# Patient Record
Sex: FEMALE | Race: WHITE | Employment: FULL TIME | URBAN - METROPOLITAN AREA
[De-identification: names, ages, dates, MRNs, and addresses within clinical notes are randomized per-mention and may not be internally consistent; named-entity substitution may affect disease eponyms.]

---

## 2020-10-21 ENCOUNTER — TRANSCRIBE ORDER (OUTPATIENT)
Dept: SCHEDULING | Age: 35
End: 2020-10-21

## 2020-10-21 DIAGNOSIS — H47.11 PAPILLEDEMA ASSOCIATED WITH INCREASED INTRACRANIAL PRESSURE: Primary | ICD-10-CM

## 2020-10-27 ENCOUNTER — HOSPITAL ENCOUNTER (OUTPATIENT)
Dept: MRI IMAGING | Age: 35
Discharge: HOME | End: 2020-10-27
Payer: COMMERCIAL

## 2020-10-27 VITALS — WEIGHT: 180 LBS

## 2020-10-27 DIAGNOSIS — H47.11 PAPILLEDEMA ASSOCIATED WITH INCREASED INTRACRANIAL PRESSURE: ICD-10-CM

## 2020-10-27 PROCEDURE — 70553 MRI BRAIN STEM W/O & W/DYE: CPT

## 2020-10-27 PROCEDURE — A9575 INJ GADOTERATE MEGLUMI 0.1ML: HCPCS | Performed by: RADIOLOGY

## 2020-10-27 PROCEDURE — 74011250636 HC RX REV CODE- 250/636: Performed by: RADIOLOGY

## 2020-10-27 RX ORDER — GADOTERATE MEGLUMINE 376.9 MG/ML
16 INJECTION INTRAVENOUS ONCE
Status: DISCONTINUED | OUTPATIENT
Start: 2020-10-27 | End: 2020-10-27 | Stop reason: SDUPTHER

## 2020-10-27 RX ORDER — GADOTERATE MEGLUMINE 376.9 MG/ML
15 INJECTION INTRAVENOUS ONCE
Status: COMPLETED | OUTPATIENT
Start: 2020-10-27 | End: 2020-10-27

## 2020-10-27 RX ADMIN — GADOTERATE MEGLUMINE 15 ML: 376.9 INJECTION INTRAVENOUS at 18:15

## 2020-10-30 ENCOUNTER — TRANSCRIBE ORDER (OUTPATIENT)
Dept: SCHEDULING | Age: 35
End: 2020-10-30

## 2020-12-01 ENCOUNTER — OFFICE VISIT (OUTPATIENT)
Dept: PRIMARY CARE CLINIC | Age: 35
End: 2020-12-01
Payer: COMMERCIAL

## 2020-12-01 VITALS
TEMPERATURE: 98.6 F | HEART RATE: 112 BPM | WEIGHT: 178.8 LBS | DIASTOLIC BLOOD PRESSURE: 88 MMHG | SYSTOLIC BLOOD PRESSURE: 140 MMHG | RESPIRATION RATE: 14 BRPM | OXYGEN SATURATION: 97 %

## 2020-12-01 DIAGNOSIS — E55.9 VITAMIN D DEFICIENCY: ICD-10-CM

## 2020-12-01 DIAGNOSIS — F41.8 OTHER SPECIFIED ANXIETY DISORDERS: ICD-10-CM

## 2020-12-01 DIAGNOSIS — R00.0 TACHYCARDIA: ICD-10-CM

## 2020-12-01 DIAGNOSIS — L30.9 ECZEMA OF BOTH HANDS: ICD-10-CM

## 2020-12-01 DIAGNOSIS — R51.9 FREQUENT HEADACHES: ICD-10-CM

## 2020-12-01 DIAGNOSIS — G93.2 IDIOPATHIC INTRACRANIAL HYPERTENSION: Primary | ICD-10-CM

## 2020-12-01 PROCEDURE — 99204 OFFICE O/P NEW MOD 45 MIN: CPT | Performed by: INTERNAL MEDICINE

## 2020-12-01 RX ORDER — FLUOXETINE 10 MG/1
10 CAPSULE ORAL DAILY
Qty: 30 CAP | Refills: 0 | Status: SHIPPED | OUTPATIENT
Start: 2020-12-01 | End: 2020-12-31

## 2020-12-01 RX ORDER — DESONIDE 0.5 MG/G
OINTMENT TOPICAL 2 TIMES DAILY
Qty: 15 G | Refills: 0 | Status: SHIPPED | OUTPATIENT
Start: 2020-12-01 | End: 2021-03-22 | Stop reason: SDUPTHER

## 2020-12-01 RX ORDER — LEVONORGESTREL 52 MG/1
INTRAUTERINE DEVICE INTRAUTERINE
COMMUNITY

## 2020-12-01 RX ORDER — CHOLECALCIFEROL (VITAMIN D3) 50 MCG
CAPSULE ORAL
COMMUNITY

## 2020-12-01 RX ORDER — ACETAZOLAMIDE 250 MG/1
250 TABLET ORAL 2 TIMES DAILY
COMMUNITY
Start: 2020-11-04

## 2020-12-01 RX ORDER — CETIRIZINE HYDROCHLORIDE, PSEUDOEPHEDRINE HYDROCHLORIDE 5; 120 MG/1; MG/1
1 TABLET, FILM COATED, EXTENDED RELEASE ORAL 2 TIMES DAILY
COMMUNITY

## 2020-12-01 NOTE — PROGRESS NOTES
Chief Complaint   Patient presents with   [facility].     Neuro ophthalmologist.    3 most recent PHQ Screens 12/1/2020   Little interest or pleasure in doing things Several days   Feeling down, depressed, irritable, or hopeless Several days   Total Score PHQ 2 2     Abuse Screening Questionnaire 12/1/2020   Do you ever feel afraid of your partner? N   Are you in a relationship with someone who physically or mentally threatens you? N   Is it safe for you to go home? Y     Visit Vitals  BP (!) 133/94 (BP 1 Location: Right arm, BP Patient Position: Sitting)   Pulse (!) 112   Temp 98.6 °F (37 °C) (Oral)   Resp 14   Wt 178 lb 12.8 oz (81.1 kg)   SpO2 97%     1. Have you been to the ER, urgent care clinic since your last visit? Hospitalized since your last visit?no    2. Have you seen or consulted any other health care providers outside of the [address] [place] [address] since your last visit? Include any pap smears or colon screening. Neuro ophthalmologist.

## 2020-12-01 NOTE — PROGRESS NOTES
Written by Pedro Luis Vo, as dictated by Dr. Christian Hanks MD.    Erika Cardenas is a 37 y.o. female. HPI  Pt presents today to establish care. She has lived in [address] [place] [address] for 2 years and has not had a PCP in 10 years. She had previously been living in the [facility] where it was harder to get an appt with a doctor. However, about a month ago she had an eye exam done, and her optometrist found some optic nerve swelling. She was referred to see a neuro-ophthalmologist who diagnosed her with idiopathic intracranial hypertension and started her on Diamox. Once she had this diagnosis, she decided she should start seeing a PCP to make sure she does not have any other underlying conditions. Pt's BP is elevated today in office at 153/96, 140/88 on manual repeat in L arm while sitting down. Her heart rate is elevated today in office as well, and she notes that this typically happens when she is anxious. Since starting Diamox, she has been having more frequent headaches. She has an appt coming up with her neuro-ophthalmologist this week and will be able to have this evaluated. She has been dealing with anxiety for her entire life, and she was first diagnosed with it when she was 11years old. Her parents managed it when she was younger, and she learned coping strategies by working with a psychologist. She is not currently talking to a psychologist and has not taken a medication for anxiety in a long time, but she remembers that Prozac worked for her when she was a child. She is open to trying medications and starting something today. Her last Pap smear was in 08/2018. She got her flu vaccine in September, and she got her last Tdap vaccine in 2011. 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           Current Outpatient Medications on File Prior to Visit   Medication Sig Dispense Refill    acetaZOLAMIDE (DIAMOX) 250 mg tablet TAKE 2 TABLETS BY MOUTH TWICE A DAY      cetirizine-pseudoePHEDrine (ZyrTEC-D) 5-120 mg Tb12 Take 1 Tab by mouth two (2) times a day.  B.infantis-B.ani-B.long-B.bifi (Probiotic 4X) 10-15 mg TbEC Take  by mouth.  levonorgestreL (Mirena) 20 mcg/24 hours (6 yrs) 52 mg IUD Mirena 20 mcg/24 hours (6 yrs) 52 mg intrauterine device   Take by intrauterine route. No current facility-administered medications on file prior to visit.         No Known Allergies    Past Medical History:   Diagnosis Date    Idiopathic intracranial hypertension        Past Surgical History:   Procedure Laterality Date    HX  SECTION      HX DILATION AND CURETTAGE         Family History   Problem Relation Age of Onset    Hypertension Mother    [address] [facility] Elevated Lipids Mother        Social History     Socioeconomic History    Marital status:      Spouse name: Not on file    Number of children: Not on file    Years of education: Not on file    Highest education level: Not on file   Occupational History    Not on file   Social Needs    Financial resource strain: Not on file    Food insecurity     Worry: Not on file     Inability: Not on file    Transportation needs     Medical: Not on file     Non-medical: Not on file   Tobacco Use    Smoking status: Never Smoker    Smokeless tobacco: Never Used   Substance and Sexual Activity    Alcohol use: Never     Frequency: Never    Drug use: Never    Sexual activity: Yes     Partners: Male     Birth control/protection: Surgical   Lifestyle    Physical activity     Days per week: Not on file     Minutes per session: Not on file    Stress: Not on file   Relationships    Social connections     Talks on phone: Not on file     Gets together: Not on file     Attends Anabaptist service: Not on file     Active member of club or organization: Not on file     Attends meetings of clubs or organizations: Not on file     Relationship status: Not on file    Intimate partner violence Fear of current or ex partner: Not on file     Emotionally abused: Not on file     Physically abused: Not on file     Forced sexual activity: Not on file   Other Topics Concern    Not on file   Social History Narrative    Not on file       No results found for any previous visit. Review of Systems   Constitutional: Negative for malaise/fatigue and weight loss. HENT: Negative for congestion and sore throat. Eyes: Negative for blurred vision, double vision and photophobia. Respiratory: Negative for cough and shortness of breath. Cardiovascular: Negative for chest pain and leg swelling. Gastrointestinal: Negative for constipation and heartburn. Genitourinary: Negative for frequency and urgency. Musculoskeletal: Negative for back pain, joint pain and myalgias. Neurological: Positive for headaches. Negative for dizziness. Psychiatric/Behavioral: Negative for depression. The patient is nervous/anxious. The patient does not have insomnia. Visit Vitals  BP (!) 140/88 (BP 1 Location: Left arm, BP Patient Position: Sitting)   Pulse (!) 112   Temp 98.6 °F (37 °C) (Oral)   Resp 14   Wt 178 lb 12.8 oz (81.1 kg)   LMP 11/10/2020   SpO2 97%     Physical Exam  Vitals signs and nursing note reviewed. Constitutional:       General: She is not in acute distress. Appearance: Normal appearance. She is well-developed and well-groomed. She is not diaphoretic. HENT:      Right Ear: External ear normal.      Left Ear: External ear normal.   Eyes:      General: No scleral icterus. Right eye: No discharge. Left eye: No discharge. Extraocular Movements: Extraocular movements intact. Neck:      Musculoskeletal: Normal range of motion and neck supple. Cardiovascular:      Rate and Rhythm: Normal rate and regular rhythm. Pulmonary:      Effort: Pulmonary effort is normal.      Breath sounds: Normal breath sounds. No wheezing. Musculoskeletal:      Right lower leg: No edema. Left lower leg: No edema. Lymphadenopathy:      Cervical: No cervical adenopathy. Neurological:      Mental Status: She is alert and oriented to person, place, and time. Psychiatric:         Mood and Affect: Mood and affect normal.         Behavior: Behavior normal.       ASSESSMENT and PLAN    ICD-10-CM ICD-9-CM    1. Idiopathic intracranial hypertension  O82.3 968.7 METABOLIC PANEL, COMPREHENSIVE      CBC W/O DIFF      TSH 3RD GENERATION      LIPID PANEL    I ordered fasting labs for her to return and have done soon. 2. Tachycardia  R00.0 785. 0 This may be related to anxiety, but I instructed her to buy a BP monitor and start checking her BP and pulse at home. Instructed patient to keep a log and bring it to next appointment. 3. Frequent headaches  R51.9 784.0 I instructed her to let me know what her neuro-ophthalmologist says about her frequent headaches and if there are any changes in her medications. 4. Other specified anxiety disorders  F41.8 300.09 REFERRAL TO PSYCHOLOGY    I provided a referral to psychology and informed her of multiple different places in this are where she could establish care. Instructed her to call and see which places have openings. FLUoxetine (PROzac) 10 mg capsule sent to pharmacy. I prescribed Prozac 10 mg and instructed pt to take 1 tablet every day for the next 2 weeks. If they experience no sfx, they should increase to 2 tablets every day. I asked the pt to contact me soon to let me know how they are feeling. 5. Vitamin D deficiency  E55.9 268.9 VITAMIN D, 25 HYDROXY    Check vitamin D. This plan was reviewed with the patient and patient agrees. All questions were answered. This scribe documentation was reviewed by me and accurately reflects the examination and decisions made by me.

## 2020-12-02 DIAGNOSIS — F41.8 OTHER SPECIFIED ANXIETY DISORDERS: ICD-10-CM

## 2020-12-02 RX ORDER — FLUOXETINE 10 MG/1
10 CAPSULE ORAL DAILY
Qty: 30 CAP | Refills: 0 | OUTPATIENT
Start: 2020-12-02 | End: 2021-01-01

## 2020-12-08 DIAGNOSIS — F41.8 OTHER SPECIFIED ANXIETY DISORDERS: ICD-10-CM

## 2020-12-08 RX ORDER — FLUOXETINE 10 MG/1
10 CAPSULE ORAL DAILY
Qty: 30 CAP | Refills: 0 | Status: CANCELLED | OUTPATIENT
Start: 2020-12-08 | End: 2021-01-07

## 2020-12-28 DIAGNOSIS — F41.9 ANXIETY: Primary | ICD-10-CM

## 2020-12-28 LAB
25(OH)D3 SERPL-MCNC: 16.9 NG/ML (ref 30–100)
ALBUMIN SERPL-MCNC: 3.9 G/DL (ref 3.5–5)
ALBUMIN/GLOB SERPL: 0.9 {RATIO} (ref 1.1–2.2)
ALP SERPL-CCNC: 103 U/L (ref 45–117)
ALT SERPL-CCNC: 22 U/L (ref 12–78)
ANION GAP SERPL CALC-SCNC: 8 MMOL/L (ref 5–15)
AST SERPL-CCNC: 16 U/L (ref 15–37)
BILIRUB SERPL-MCNC: 0.6 MG/DL (ref 0.2–1)
BUN SERPL-MCNC: 11 MG/DL (ref 6–20)
BUN/CREAT SERPL: 13 (ref 12–20)
CALCIUM SERPL-MCNC: 9 MG/DL (ref 8.5–10.1)
CHLORIDE SERPL-SCNC: 110 MMOL/L (ref 97–108)
CHOLEST SERPL-MCNC: 174 MG/DL
CO2 SERPL-SCNC: 21 MMOL/L (ref 21–32)
CREAT SERPL-MCNC: 0.86 MG/DL (ref 0.55–1.02)
ERYTHROCYTE [DISTWIDTH] IN BLOOD BY AUTOMATED COUNT: 12.5 % (ref 11.5–14.5)
GLOBULIN SER CALC-MCNC: 4.2 G/DL (ref 2–4)
GLUCOSE SERPL-MCNC: 93 MG/DL (ref 65–100)
HCT VFR BLD AUTO: 47.9 % (ref 35–47)
HDLC SERPL-MCNC: 41 MG/DL
HDLC SERPL: 4.2 {RATIO} (ref 0–5)
HGB BLD-MCNC: 15.8 G/DL (ref 11.5–16)
LDLC SERPL CALC-MCNC: 109.6 MG/DL (ref 0–100)
LIPID PROFILE,FLP: ABNORMAL
MCH RBC QN AUTO: 30.6 PG (ref 26–34)
MCHC RBC AUTO-ENTMCNC: 33 G/DL (ref 30–36.5)
MCV RBC AUTO: 92.6 FL (ref 80–99)
NRBC # BLD: 0 K/UL (ref 0–0.01)
NRBC BLD-RTO: 0 PER 100 WBC
PLATELET # BLD AUTO: 374 K/UL (ref 150–400)
PMV BLD AUTO: 9.7 FL (ref 8.9–12.9)
POTASSIUM SERPL-SCNC: 4 MMOL/L (ref 3.5–5.1)
PROT SERPL-MCNC: 8.1 G/DL (ref 6.4–8.2)
RBC # BLD AUTO: 5.17 M/UL (ref 3.8–5.2)
SODIUM SERPL-SCNC: 139 MMOL/L (ref 136–145)
TRIGL SERPL-MCNC: 117 MG/DL (ref ?–150)
TSH SERPL DL<=0.05 MIU/L-ACNC: 2.75 UIU/ML (ref 0.36–3.74)
VLDLC SERPL CALC-MCNC: 23.4 MG/DL
WBC # BLD AUTO: 7.8 K/UL (ref 3.6–11)

## 2020-12-28 RX ORDER — FLUOXETINE 10 MG/1
10 CAPSULE ORAL DAILY
Qty: 90 CAP | Refills: 0 | Status: SHIPPED | OUTPATIENT
Start: 2020-12-28 | End: 2021-03-25 | Stop reason: SDUPTHER

## 2020-12-31 NOTE — PROGRESS NOTES
Kim Hawkins, hope you are doing well. Your blood report is back and vitamin D came back low. LDL ( bad cholesterol) came back slightly elevated. I would suggest low carb diet & exercise 3-4 times a week. Please start Vitamin D3 over the counter 1000 I.U daily dose. Rest of the blood work came back fine. How is Prozac  working?

## 2021-01-18 ENCOUNTER — VIRTUAL VISIT (OUTPATIENT)
Dept: PRIMARY CARE CLINIC | Age: 36
End: 2021-01-18
Payer: COMMERCIAL

## 2021-01-18 DIAGNOSIS — M54.2 NECK PAIN: ICD-10-CM

## 2021-01-18 DIAGNOSIS — E55.9 VITAMIN D DEFICIENCY: ICD-10-CM

## 2021-01-18 DIAGNOSIS — F41.9 ANXIETY: ICD-10-CM

## 2021-01-18 DIAGNOSIS — M62.838 NECK MUSCLE SPASM: Primary | ICD-10-CM

## 2021-01-18 PROCEDURE — 99213 OFFICE O/P EST LOW 20 MIN: CPT | Performed by: INTERNAL MEDICINE

## 2021-01-18 RX ORDER — CYCLOBENZAPRINE HCL 10 MG
10 TABLET ORAL
Qty: 10 TAB | Refills: 0 | Status: SHIPPED | OUTPATIENT
Start: 2021-01-18 | End: 2021-01-28

## 2021-01-18 NOTE — PROGRESS NOTES
Myrna Barragan (: 1985) is a 37 y.o. female, established patient, here for evaluation of the following chief complaint(s):   No chief complaint on file. Written by Chester Mendes, as dictated by Dr. Hillary Stearns MD.      SUBJECTIVE/OBJECTIVE:  HPI  Pt presents virtually today to discuss neck pain which started this morning. She got out of the shower this morning and reached for her towel when she felt a sharp pain in her neck. This pain comes back if she moves her neck at all and is severe. She continues on Prozac every day and started taking a vitamin D supplement since her labs showed that it was low (16.9 on 20). Current Outpatient Medications on File Prior to Visit   Medication Sig Dispense Refill    FLUoxetine (PROzac) 10 mg capsule Take 1 Cap by mouth daily for 90 days. 90 Cap 0    acetaZOLAMIDE (DIAMOX) 250 mg tablet TAKE 2 TABLETS BY MOUTH TWICE A DAY      levonorgestreL (Mirena) 20 mcg/24 hours (6 yrs) 52 mg IUD Mirena 20 mcg/24 hours (6 yrs) 52 mg intrauterine device   Take by intrauterine route.  cetirizine-pseudoePHEDrine (ZyrTEC-D) 5-120 mg Tb12 Take 1 Tab by mouth two (2) times a day.  B.infantis-B.ani-B.long-B.bifi (Probiotic 4X) 10-15 mg TbEC Take  by mouth.  desonide (DESOWEN) 0.05 % topical ointment Apply  to affected area two (2) times a day. 15 g 0     No current facility-administered medications on file prior to visit.         No Known Allergies    Past Medical History:   Diagnosis Date    Idiopathic intracranial hypertension        Past Surgical History:   Procedure Laterality Date    HX  SECTION      HX DILATION AND CURETTAGE         Family History   Problem Relation Age of Onset    Hypertension Mother     Elevated Lipids Mother        Social History     Socioeconomic History    Marital status:      Spouse name: Not on file    Number of children: Not on file    Years of education: Not on file    Highest education level: Not on file   Occupational History    Not on file   Social Needs    Financial resource strain: Not on file    Food insecurity     Worry: Not on file     Inability: Not on file    Transportation needs     Medical: Not on file     Non-medical: Not on file   Tobacco Use    Smoking status: Never Smoker    Smokeless tobacco: Never Used   Substance and Sexual Activity    Alcohol use: Never     Frequency: Never    Drug use: Never    Sexual activity: Yes     Partners: Male     Birth control/protection: Surgical   Lifestyle    Physical activity     Days per week: Not on file     Minutes per session: Not on file    Stress: Not on file   Relationships    Social connections     Talks on phone: Not on file     Gets together: Not on file     Attends Confucianism service: Not on file     Active member of club or organization: Not on file     Attends meetings of clubs or organizations: Not on file     Relationship status: Not on file    Intimate partner violence     Fear of current or ex partner: Not on file     Emotionally abused: Not on file     Physically abused: Not on file     Forced sexual activity: Not on file   Other Topics Concern    Not on file   Social History Narrative    Not on file       Office Visit on 12/01/2020   Component Date Value Ref Range Status    LIPID PROFILE 12/28/2020        Final    Cholesterol, total 12/28/2020 174  <200 MG/DL Final    Triglyceride 12/28/2020 117  <150 MG/DL Final    Comment: Based on NCEP-ATP III:  Triglycerides <150 mg/dL  is considered normal, 150-199  mg/dL  borderline high,  200-499 mg/dL high and  greater than or equal to 500  mg/dL very high.  HDL Cholesterol 12/28/2020 41  MG/DL Final    Comment: Based on NCEP ATP III, HDL Cholesterol <40 mg/dL is considered low and >60  mg/dL is elevated.       LDL, calculated 12/28/2020 109.6* 0 - 100 MG/DL Final    Comment: Based on the NCEP-ATP: LDL-C concentrations are considered  optimal <100 mg/dL,  near optimal/above Normal 100-129 mg/dL Borderline High: 130-159, High: 160-189  mg/dL Very High: Greater than or equal to 190 mg/dL      VLDL, calculated 12/28/2020 23.4  MG/DL Final    CHOL/HDL Ratio 12/28/2020 4.2  0.0 - 5.0   Final    Vitamin D 25-Hydroxy 12/28/2020 16.9* 30 - 100 ng/mL Final    Comment: (NOTE)  Deficiency               <20 ng/mL  Insufficiency          20-30 ng/mL  Sufficient             ng/mL  Possible toxicity       >100 ng/mL    The Method used is Siemens Advia Centaur currently standardized to a   Center of Disease Control and Prevention (CDC) certified reference   [facility]. Samples containing fluorescein dye can produce falsely   elevated values when tested with the ADVIA Centaur Vitamin D Assay. It is recommended that results in the toxic range, >100 ng/mL, be   retested 72 hours post fluorescein exposure.  TSH 12/28/2020 2.75  0.36 - 3.74 uIU/mL Final    Comment:   Due to TSH heterogeneity, both structurally and degree of glycosylation,  monoclonal antibodies used in the TSH assay may not accurately quantitate TSH.   Therefore, this result should be correlated with clinical findings as well as  with other assessments of thyroid function, e.g., free T4, free T3.      WBC 12/28/2020 7.8  3.6 - 11.0 K/uL Final    RBC 12/28/2020 5.17  3.80 - 5.20 M/uL Final    HGB 12/28/2020 15.8  11.5 - 16.0 g/dL Final    HCT 12/28/2020 47.9* 35.0 - 47.0 % Final    MCV 12/28/2020 92.6  80.0 - 99.0 FL Final    MCH 12/28/2020 30.6  26.0 - 34.0 PG Final    MCHC 12/28/2020 33.0  30.0 - 36.5 g/dL Final    RDW 12/28/2020 12.5  11.5 - 14.5 % Final    PLATELET 62/73/4626 886  150 - 400 K/uL Final    MPV 12/28/2020 9.7  8.9 - 12.9 FL Final    NRBC 12/28/2020 0.0  0  WBC Final    ABSOLUTE NRBC 12/28/2020 0.00  0.00 - 0.01 K/uL Final    Sodium 12/28/2020 139  136 - 145 mmol/L Final    Potassium 12/28/2020 4.0  3.5 - 5.1 mmol/L Final    Chloride 12/28/2020 110* 97 - 108 mmol/L Final    CO2 12/28/2020 21  21 - 32 mmol/L Final    Anion gap 12/28/2020 8  5 - 15 mmol/L Final    Glucose 12/28/2020 93  65 - 100 mg/dL Final    BUN 12/28/2020 11  6 - 20 MG/DL Final    Creatinine 12/28/2020 0.86  0.55 - 1.02 MG/DL Final    BUN/Creatinine ratio 12/28/2020 13  12 - 20   Final    GFR est AA 12/28/2020 >60  >60 ml/min/1.73m2 Final    GFR est non-AA 12/28/2020 >60  >60 ml/min/1.73m2 Final    Comment: Estimated GFR is calculated using the IDMS-traceable Modification of Diet in  Renal Disease (MDRD) Study equation, reported for both  Americans  (GFRAA) and non- Americans (GFRNA), and normalized to 1.73m2 body  surface area. The physician must decide which value applies to the patient.  Calcium 12/28/2020 9.0  8.5 - 10.1 MG/DL Final    Bilirubin, total 12/28/2020 0.6  0.2 - 1.0 MG/DL Final    ALT (SGPT) 12/28/2020 22  12 - 78 U/L Final    AST (SGOT) 12/28/2020 16  15 - 37 U/L Final    Alk. phosphatase 12/28/2020 103  45 - 117 U/L Final    Protein, total 12/28/2020 8.1  6.4 - 8.2 g/dL Final    Albumin 12/28/2020 3.9  3.5 - 5.0 g/dL Final    Globulin 12/28/2020 4.2* 2.0 - 4.0 g/dL Final    A-G Ratio 12/28/2020 0.9* 1.1 - 2.2   Final     Review of Systems   Constitutional: Negative for activity change, fatigue and unexpected weight change. HENT: Negative for congestion, hearing loss, rhinorrhea and sore throat. Eyes: Negative for discharge. Respiratory: Negative for cough, chest tightness and shortness of breath. Cardiovascular: Negative for leg swelling. Gastrointestinal: Negative for abdominal pain, constipation and diarrhea. Genitourinary: Negative for dysuria, flank pain, frequency and urgency. Musculoskeletal: Positive for neck pain. Negative for arthralgias, back pain and myalgias. Skin: Negative for color change and rash. Neurological: Negative for dizziness, light-headedness and headaches.    Psychiatric/Behavioral: Negative for dysphoric mood and sleep disturbance. The patient is not nervous/anxious. No flowsheet data found. Physical Exam    [INSTRUCTIONS:  \"[x]\" Indicates a positive item  \"[]\" Indicates a negative item  -- DELETE ALL ITEMS NOT EXAMINED]    Constitutional: [x] Appears well-developed and well-nourished [x] No apparent distress      [] Abnormal -     Mental status: [x] Alert and awake  [x] Oriented to person/place/time [x] Able to follow commands    [] Abnormal -     Eyes:   EOM    [x]  Normal    [] Abnormal -   Sclera  [x]  Normal    [] Abnormal -          Discharge [x]  None visible   [] Abnormal -     HENT: [x] Normocephalic, atraumatic  [] Abnormal -   [x] Mouth/Throat: Mucous membranes are moist    External Ears [x] Normal  [] Abnormal -    Neck: [x] No visualized mass [] Abnormal -     Pulmonary/Chest: [x] Respiratory effort normal   [x] No visualized signs of difficulty breathing or respiratory distress        [] Abnormal -      Musculoskeletal:   [x] Normal gait with no signs of ataxia         [x] Normal range of motion of neck        [] Abnormal -     Neurological:        [x] No Facial Asymmetry (Cranial nerve 7 motor function) (limited exam due to video visit)          [x] No gaze palsy        [] Abnormal -          Skin:        [x] No significant exanthematous lesions or discoloration noted on facial skin         [] Abnormal -            Psychiatric:       [x] Normal Affect [] Abnormal -        [x] No Hallucinations      ASSESSMENT/PLAN:  1. Neck muscle spasm  -     cyclobenzaprine (FLEXERIL) 10 mg tablet; Take 1 Tab by mouth nightly for 10 days. , Normal, Disp-10 Tab, R-0 sent to pharmacy. Potential side effects were discussed. I instructed her to take Flexeril qPM for a few days to help relieve her neck spasm. 2. Neck pain  I instructed her to take ibuprofen 600 mg BID with meals for 3 days and if no improvement will do physical therapy and imaging.     3. Anxiety  Stable, she continues on Prozac and it is working well for her. 4. Vitamin D deficiency  She continues on vitamin D every day. This plan was reviewed with the patient and patient agrees. All questions were answered. This scribe documentation was reviewed by me and accurately reflects the examination and decisions made by me. Valentino Koller is being evaluated by a Virtual Visit (video visit) encounter to address concerns as mentioned above. . Due to this being a TeleHealth encounter (During [facility] public health emergency), evaluation of the following organ systems was limited: Vitals/Constitutional/EENT/Resp/CV/GI//MS/Neuro/Skin/Heme-Lymph-Imm. Pursuant to the emergency declaration under the [address] [place] [address] authority and the [employer] Act, this Virtual Visit was conducted with patient's (and/or legal guardian's) consent, to reduce the patient's risk of exposure to COVID-19 and provide necessary medical care. The patient (and/or legal guardian) has also been advised to contact this office for worsening conditions or problems, and seek emergency medical treatment and/or call 911 if deemed necessary. Patient identification was verified at the start of the visit: YES    Services were provided through a video synchronous discussion virtually to substitute for in-person clinic visit. Patient was located at home and provider was located in office. An electronic signature was used to authenticate this note.   -- Jennifer Ca

## 2021-03-22 DIAGNOSIS — L30.9 ECZEMA OF BOTH HANDS: ICD-10-CM

## 2021-03-22 RX ORDER — DESONIDE 0.5 MG/G
OINTMENT TOPICAL 2 TIMES DAILY
Qty: 15 G | Refills: 0 | Status: SHIPPED | OUTPATIENT
Start: 2021-03-22 | End: 2021-10-21 | Stop reason: SDUPTHER

## 2021-03-24 DIAGNOSIS — F41.9 ANXIETY: ICD-10-CM

## 2021-03-25 DIAGNOSIS — F41.9 ANXIETY: ICD-10-CM

## 2021-03-25 RX ORDER — FLUOXETINE 10 MG/1
10 CAPSULE ORAL DAILY
Qty: 90 CAP | Refills: 0 | OUTPATIENT
Start: 2021-03-25 | End: 2021-06-23

## 2021-03-25 RX ORDER — FLUOXETINE 10 MG/1
10 CAPSULE ORAL DAILY
Qty: 90 CAP | Refills: 0 | Status: SHIPPED | OUTPATIENT
Start: 2021-03-25 | End: 2021-06-18

## 2021-06-18 DIAGNOSIS — F41.9 ANXIETY: ICD-10-CM

## 2021-06-18 RX ORDER — FLUOXETINE 10 MG/1
CAPSULE ORAL
Qty: 90 CAPSULE | Refills: 0 | Status: SHIPPED | OUTPATIENT
Start: 2021-06-18 | End: 2021-09-09

## 2021-09-09 DIAGNOSIS — F41.9 ANXIETY: ICD-10-CM

## 2021-09-09 RX ORDER — FLUOXETINE 10 MG/1
CAPSULE ORAL
Qty: 90 CAPSULE | Refills: 0 | Status: SHIPPED | OUTPATIENT
Start: 2021-09-09 | End: 2021-12-04

## 2021-10-21 DIAGNOSIS — L30.9 ECZEMA OF BOTH HANDS: ICD-10-CM

## 2021-10-21 RX ORDER — DESONIDE 0.5 MG/G
OINTMENT TOPICAL 2 TIMES DAILY
Qty: 15 G | Refills: 0 | Status: SHIPPED | OUTPATIENT
Start: 2021-10-21 | End: 2022-05-04 | Stop reason: SDUPTHER

## 2021-10-21 NOTE — TELEPHONE ENCOUNTER
Requested Prescriptions     Pending Prescriptions Disp Refills    desonide (DESOWEN) 0.05 % topical ointment 15 g 0     Sig: Apply  to affected area two (2) times a day.         Last Visit 1/18/21  Last Refill 3/22/21

## 2022-01-06 ENCOUNTER — PATIENT MESSAGE (OUTPATIENT)
Dept: PRIMARY CARE CLINIC | Age: 37
End: 2022-01-06

## 2022-01-07 NOTE — TELEPHONE ENCOUNTER
Called and spoke with patient, she will be needing an appointment to discuss increasing the Prozac.  Patient stated she will call office back to schedule

## 2022-01-07 NOTE — TELEPHONE ENCOUNTER
Diane Welsh MD 1/6/2022 7:04 PM EST    Last seen in 2021 . Yes, needs a Virtual visit   ----- Message -----  From: Radu Alberto  Sent: 1/6/2022 1:03 PM EST  To: Kalin Denton MD  Subject: FW: Dosage increase? Needing an appointment to increase dosage?   ----- Message -----  From: Roslyn Noe  Sent: 1/6/2022 11:46 AM EST  To: [facility]  Subject: Dosage increase? I was wondering if it was possible to increase my dose of Prozac. My anxiety symptoms have been increasing recently and I found over winter break, it was beginning to get unmanageable. I'm currently on 10mg per day and it was doing great for a while. I've been noticing increased feelings of general anxiety about work and in my personal life and I'm hoping an increased dose would midigate some of those feelings. Thanks!

## 2022-04-21 ENCOUNTER — TELEPHONE (OUTPATIENT)
Dept: PRIMARY CARE CLINIC | Age: 37
End: 2022-04-21

## 2022-04-21 NOTE — TELEPHONE ENCOUNTER
Called pt, no answer. [id number] [place] [address]    ----- Message from Salinas Talia sent at 4/21/2022 11:53 AM EDT -----  Subject: Appointment Request    Reason for Call: Routine Medicare AWV    QUESTIONS  Type of Appointment? Established Patient  Reason for appointment request? Other - n/a  Additional Information for Provider? Pt is calling for appt due to that   she will be leaving out of town. Pt need a AWV for refill medication  ---------------------------------------------------------------------------  --------------  CALL BACK INFO  What is the best way for the office to contact you? OK to leave message on   voicemail  Preferred Call Back Phone Number? [phone]  ---------------------------------------------------------------------------  --------------  SCRIPT ANSWERS  Relationship to Patient? Self  (If the patient has Medicare as their primary insurance coverage ask this   question) Are you requesting a Medicare Annual Wellness Visit? Yes   (Is the patient requesting a pap smear with their physical exam?)? No  (Is the patient requesting their annual physical and does not need PAP or   AWV per above?)? No  Have you been diagnosed with, awaiting test results for, or told that you   are suspected of having COVID-19 (Coronavirus)? (If patient has tested   negative or was tested as a requirement for work, school, or travel and   not based on symptoms, answer no)? No  Within the past 10 days have you developed any of the following symptoms   (answer no if symptoms have been present longer than 10 days or began   more than 10 days ago)? Fever or Chills, Cough, Shortness of breath or   difficulty breathing, Loss of taste or smell, Sore throat, Nasal   congestion, Sneezing or runny nose, Fatigue or generalized body aches   (answer no if pain is specific to a body part e.g. back pain), Diarrhea,   Headache? No  Have you had close contact with someone with COVID-19 in the last 7 days?    No  (Service Expert - click yes below to proceed with [employer] As Usual   Scheduling)?  Yes

## 2022-04-22 DIAGNOSIS — F41.9 ANXIETY: ICD-10-CM

## 2022-04-22 RX ORDER — FLUOXETINE 10 MG/1
10 CAPSULE ORAL DAILY
Qty: 30 CAPSULE | Refills: 0 | Status: SHIPPED | OUTPATIENT
Start: 2022-04-22 | End: 2022-05-04 | Stop reason: SDUPTHER

## 2022-04-22 NOTE — TELEPHONE ENCOUNTER
Requested Prescriptions     Pending Prescriptions Disp Refills    FLUoxetine (PROzac) 10 mg capsule 30 Capsule 0     Sig: Take 1 Capsule by mouth daily.         Last Visit 12/1/2020  Last Refill 3/25/22

## 2022-05-04 ENCOUNTER — OFFICE VISIT (OUTPATIENT)
Dept: PRIMARY CARE CLINIC | Age: 37
End: 2022-05-04
Payer: COMMERCIAL

## 2022-05-04 VITALS
OXYGEN SATURATION: 100 % | TEMPERATURE: 97.8 F | HEIGHT: 63 IN | WEIGHT: 176.5 LBS | BODY MASS INDEX: 31.27 KG/M2 | SYSTOLIC BLOOD PRESSURE: 128 MMHG | DIASTOLIC BLOOD PRESSURE: 89 MMHG | RESPIRATION RATE: 18 BRPM | HEART RATE: 94 BPM

## 2022-05-04 DIAGNOSIS — F32.A ANXIETY AND DEPRESSION: ICD-10-CM

## 2022-05-04 DIAGNOSIS — E78.2 MIXED HYPERLIPIDEMIA: ICD-10-CM

## 2022-05-04 DIAGNOSIS — G93.2 INCREASED INTRACRANIAL PRESSURE: Primary | ICD-10-CM

## 2022-05-04 DIAGNOSIS — F41.9 ANXIETY AND DEPRESSION: ICD-10-CM

## 2022-05-04 DIAGNOSIS — L30.9 ECZEMA OF BOTH HANDS: ICD-10-CM

## 2022-05-04 DIAGNOSIS — L30.8 OTHER ECZEMA: ICD-10-CM

## 2022-05-04 DIAGNOSIS — Z11.59 NEED FOR HEPATITIS C SCREENING TEST: ICD-10-CM

## 2022-05-04 DIAGNOSIS — E55.9 VITAMIN D DEFICIENCY: ICD-10-CM

## 2022-05-04 PROCEDURE — 99214 OFFICE O/P EST MOD 30 MIN: CPT | Performed by: INTERNAL MEDICINE

## 2022-05-04 RX ORDER — FLUOXETINE 10 MG/1
10 CAPSULE ORAL DAILY
Qty: 90 CAPSULE | Refills: 1 | Status: SHIPPED | OUTPATIENT
Start: 2022-05-04 | End: 2022-10-17

## 2022-05-04 RX ORDER — DESONIDE 0.5 MG/G
OINTMENT TOPICAL 2 TIMES DAILY
Qty: 60 G | Refills: 0 | Status: SHIPPED | OUTPATIENT
Start: 2022-05-04

## 2022-05-04 NOTE — PROGRESS NOTES
Adolph Main (: 1985) is a 31 y.o. female, established patient, here for evaluation of the following chief complaint(s):  Medication Refill     Written by Rebekah Hannah, as dictated by Dr. Mani Schaeffer MD.      ASSESSMENT/PLAN:  Below is the assessment and plan developed based on review of pertinent history, physical exam, labs, studies, and medications. 1. Increased intracranial pressure  Followed by neuro ophthalmology. Continue on Diamox 250 mg 2 tabs BID. 2. Anxiety and depression  Continue with Prozac 10 mg daily. She can increase to 2 tabs daily if needed. 3. Need for hepatitis C screening test  Will check hepatitis C AB.   -     HEPATITIS C AB; Future    4. Other eczema  Well controlled on desonide 0.05% topical cream. Continue on current medication(s). Recommend using otc Cetaphil or Eucerin cream as well to prevent flare ups. 5. Vitamin D deficiency  Recheck vitamin d level. Continue vitamin d3 supplement. -     VITAMIN D, 25 HYDROXY; Future    6. Mixed hyperlipidemia  Recheck CMP, CBC, and lipid panel. Recommend she reduce carb intake such as bread, pasta, and rice. -     METABOLIC PANEL, COMPREHENSIVE; Future  -     CBC W/O DIFF; Future  -     LIPID PANEL; Future      7. Eczema of both hands  Refilled desonide ointment. -     desonide (DESOWEN) 0.05 % topical ointment; Apply  to affected area two (2) times a day., Normal, Disp-60 g, R-0 sent to pharmacy. SUBJECTIVE/OBJECTIVE:  HPI   The patient presents today for a routine follow-up of chronic conditions. She will return at a later date for blood work. She is on Prozac 10 mg daily for anxiety which has been working well overall. Work has been a little stressful especially earlier this year due to changes in mask mandates. She works as a teacher at [employer]. She uses desonide 0.05% topical ointment for eczema. She gets flare ups on her hands that is usually associated with hormonal changes.      She is on Diamox for intracranial pressure. This is followed by neuro ophthalmology. She has been taking a vitamin d3 supplement for vitamin d deficiency. She is followed by [facility] for routine gynecologic care. Current Outpatient Medications on File Prior to Visit   Medication Sig Dispense Refill    acetaZOLAMIDE (DIAMOX) 250 mg tablet Take 250 mg by mouth two (2) times a day.  cetirizine-pseudoePHEDrine (ZyrTEC-D) 5-120 mg Tb12 Take 1 Tab by mouth two (2) times a day.  B.infantis-B.ani-B.long-B.bifi (Probiotic 4X) 10-15 mg TbEC Take  by mouth.  [DISCONTINUED] FLUoxetine (PROzac) 10 mg capsule Take 1 Capsule by mouth daily. 30 Capsule 0    [DISCONTINUED] desonide (DESOWEN) 0.05 % topical ointment Apply  to affected area two (2) times a day. 15 g 0    levonorgestreL (Mirena) 20 mcg/24 hours (6 yrs) 52 mg IUD Mirena 20 mcg/24 hours (6 yrs) 52 mg intrauterine device   Take by intrauterine route. No current facility-administered medications on file prior to visit.        No Known Allergies    Past Medical History:   Diagnosis Date    Idiopathic intracranial hypertension        Past Surgical History:   Procedure Laterality Date    HX  SECTION      HX DILATION AND CURETTAGE         Family History   Problem Relation Age of Onset    Hypertension Mother    Rubi Lyon Elevated Lipids Mother        Social History     Socioeconomic History    Marital status:      Spouse name: Not on file    Number of children: Not on file    Years of education: Not on file    Highest education level: Not on file   Occupational History    Not on file   Tobacco Use    Smoking status: Never Smoker    Smokeless tobacco: Never Used   Substance and Sexual Activity    Alcohol use: Never    Drug use: Never    Sexual activity: Yes     Partners: Male     Birth control/protection: Surgical   Other Topics Concern    Not on file   Social History Narrative    Not on file       No visits with results within 3 Month(s) from this visit. Latest known visit with results is:   Office Visit on 12/01/2020   Component Date Value Ref Range Status    LIPID PROFILE 12/28/2020        Final    Cholesterol, total 12/28/2020 174  <200 MG/DL Final    Triglyceride 12/28/2020 117  <150 MG/DL Final    Comment: Based on NCEP-ATP III:  Triglycerides <150 mg/dL  is considered normal, 150-199  mg/dL  borderline high,  200-499 mg/dL high and  greater than or equal to 500  mg/dL very high.  HDL Cholesterol 12/28/2020 41  MG/DL Final    Comment: Based on NCEP ATP III, HDL Cholesterol <40 mg/dL is considered low and >60  mg/dL is elevated.  LDL, calculated 12/28/2020 109.6* 0 - 100 MG/DL Final    Comment: Based on the NCEP-ATP: LDL-C concentrations are considered  optimal <100 mg/dL,  near optimal/above Normal 100-129 mg/dL Borderline High: 130-159, High: 160-189  mg/dL Very High: Greater than or equal to 190 mg/dL      VLDL, calculated 12/28/2020 23.4  MG/DL Final    CHOL/HDL Ratio 12/28/2020 4.2  0.0 - 5.0   Final    Vitamin D 25-Hydroxy 12/28/2020 16.9* 30 - 100 ng/mL Final    Comment: (NOTE)  Deficiency               <20 ng/mL  Insufficiency          20-30 ng/mL  Sufficient             ng/mL  Possible toxicity       >100 ng/mL    The Method used is Siemens Advia Centaur currently standardized to a   Center of Disease Control and Prevention (CDC) certified reference   [facility]. Samples containing fluorescein dye can produce falsely   elevated values when tested with the ADVIA Centaur Vitamin D Assay. It is recommended that results in the toxic range, >100 ng/mL, be   retested 72 hours post fluorescein exposure.  TSH 12/28/2020 2.75  0.36 - 3.74 uIU/mL Final    Comment:   Due to TSH heterogeneity, both structurally and degree of glycosylation,  monoclonal antibodies used in the TSH assay may not accurately quantitate TSH.   Therefore, this result should be correlated with clinical findings as well as  with other assessments of thyroid function, e.g., free T4, free T3.      WBC 12/28/2020 7.8  3.6 - 11.0 K/uL Final    RBC 12/28/2020 5.17  3.80 - 5.20 M/uL Final    HGB 12/28/2020 15.8  11.5 - 16.0 g/dL Final    HCT 12/28/2020 47.9* 35.0 - 47.0 % Final    MCV 12/28/2020 92.6  80.0 - 99.0 FL Final    MCH 12/28/2020 30.6  26.0 - 34.0 PG Final    MCHC 12/28/2020 33.0  30.0 - 36.5 g/dL Final    RDW 12/28/2020 12.5  11.5 - 14.5 % Final    PLATELET 07/85/8512 067  150 - 400 K/uL Final    MPV 12/28/2020 9.7  8.9 - 12.9 FL Final    NRBC 12/28/2020 0.0  0  WBC Final    ABSOLUTE NRBC 12/28/2020 0.00  0.00 - 0.01 K/uL Final    Sodium 12/28/2020 139  136 - 145 mmol/L Final    Potassium 12/28/2020 4.0  3.5 - 5.1 mmol/L Final    Chloride 12/28/2020 110* 97 - 108 mmol/L Final    CO2 12/28/2020 21  21 - 32 mmol/L Final    Anion gap 12/28/2020 8  5 - 15 mmol/L Final    Glucose 12/28/2020 93  65 - 100 mg/dL Final    BUN 12/28/2020 11  6 - 20 MG/DL Final    Creatinine 12/28/2020 0.86  0.55 - 1.02 MG/DL Final    BUN/Creatinine ratio 12/28/2020 13  12 - 20   Final    GFR est AA 12/28/2020 >60  >60 ml/min/1.73m2 Final    GFR est non-AA 12/28/2020 >60  >60 ml/min/1.73m2 Final    Comment: Estimated GFR is calculated using the IDMS-traceable Modification of Diet in  Renal Disease (MDRD) Study equation, reported for both  Americans  (GFRAA) and non- Americans (GFRNA), and normalized to 1.73m2 body  surface area. The physician must decide which value applies to the patient.  Calcium 12/28/2020 9.0  8.5 - 10.1 MG/DL Final    Bilirubin, total 12/28/2020 0.6  0.2 - 1.0 MG/DL Final    ALT (SGPT) 12/28/2020 22  12 - 78 U/L Final    AST (SGOT) 12/28/2020 16  15 - 37 U/L Final    Alk.  phosphatase 12/28/2020 103  45 - 117 U/L Final    Protein, total 12/28/2020 8.1  6.4 - 8.2 g/dL Final    Albumin 12/28/2020 3.9  3.5 - 5.0 g/dL Final    Globulin 12/28/2020 4.2* 2.0 - 4.0 g/dL Final  A-G Ratio 12/28/2020 0.9* 1.1 - 2.2   Final      Review of Systems   Constitutional: Negative for activity change, fatigue and unexpected weight change. HENT: Negative for congestion, hearing loss, rhinorrhea and sore throat. Eyes: Negative for discharge. Respiratory: Negative for cough, chest tightness and shortness of breath. Cardiovascular: Negative for leg swelling. Gastrointestinal: Negative for abdominal pain, constipation and diarrhea. Genitourinary: Negative for dysuria, flank pain, frequency and urgency. Musculoskeletal: Negative for arthralgias, back pain and myalgias. Skin: Negative for color change and rash. Neurological: Negative for dizziness, light-headedness and headaches. Psychiatric/Behavioral: Negative for dysphoric mood and sleep disturbance. The patient is not nervous/anxious. Visit Vitals  /89 (BP 1 Location: Right arm, BP Patient Position: Sitting, BP Cuff Size: Adult)   Pulse 94   Temp 97.8 °F (36.6 °C) (Temporal)   Resp 18   Ht 5' 3\" (1.6 m)   Wt 176 lb 8 oz (80.1 kg)   LMP 04/11/2022   SpO2 100%   BMI 31.27 kg/m²      Physical Exam  Vitals and nursing note reviewed. Constitutional:       General: She is not in acute distress. Appearance: Normal appearance. She is well-developed. She is not diaphoretic. HENT:      Right Ear: External ear normal.      Left Ear: External ear normal.   Eyes:      General: No scleral icterus. Right eye: No discharge. Left eye: No discharge. Extraocular Movements: Extraocular movements intact. Conjunctiva/sclera: Conjunctivae normal.   Cardiovascular:      Rate and Rhythm: Normal rate and regular rhythm. Pulmonary:      Effort: Pulmonary effort is normal.      Breath sounds: Normal breath sounds. No wheezing. Abdominal:      General: Bowel sounds are normal.      Palpations: Abdomen is soft. Tenderness: There is no abdominal tenderness.    Musculoskeletal:      Cervical back: Normal range of motion and neck supple. Lymphadenopathy:      Cervical: No cervical adenopathy. Neurological:      Mental Status: She is alert and oriented to person, place, and time. Psychiatric:         Mood and Affect: Mood and affect normal.       An electronic signature was used to authenticate this note.   -- David Bull

## 2022-05-04 NOTE — PROGRESS NOTES
Room 5     Identified pt with two pt identifiers(name and ). Reviewed record in preparation for visit and have obtained necessary documentation. All patient medications has been reviewed. Chief Complaint   Patient presents with    Medication Refill       3 most recent PHQ Screens 2022   Little interest or pleasure in doing things Not at all   Feeling down, depressed, irritable, or hopeless Not at all   Total Score PHQ 2 0     Abuse Screening Questionnaire 2022   Do you ever feel afraid of your partner? N   Are you in a relationship with someone who physically or mentally threatens you? N   Is it safe for you to go home? Y       Health Maintenance Due   Topic    Hepatitis C Screening     Cervical cancer screen     DTaP/Tdap/Td series (2 - Td or Tdap)    Depression Screen          Health Maintenance Review: Patient reminded of \"due or due soon\" health maintenance. I have asked the patient to contact his/her primary care provider (PCP) for follow-up on his/her health maintenance. Vitals:    22 1603   BP: 128/89   Pulse: 94   Resp: 18   Temp: 97.8 °F (36.6 °C)   TempSrc: Temporal   SpO2: 100%   Weight: 176 lb 8 oz (80.1 kg)   Height: 5' 3\" (1.6 m)   PainSc:   0 - No pain   LMP: 2022       Wt Readings from Last 3 Encounters:   22 176 lb 8 oz (80.1 kg)   20 178 lb 12.8 oz (81.1 kg)   10/27/20 180 lb (81.6 kg)     Temp Readings from Last 3 Encounters:   22 97.8 °F (36.6 °C) (Temporal)   20 98.6 °F (37 °C) (Oral)     BP Readings from Last 3 Encounters:   22 128/89   20 (!) 140/88     Pulse Readings from Last 3 Encounters:   22 94   20 (!) 112       Coordination of Care Questionnaire:   1) Have you been to an emergency room, urgent care, or hospitalized since your last visit?   no       2. Have seen or consulted any other health care provider since your last visit?  NO    Advance Care Planning:   End of Life Planning: has NO advanced directive - not interested in additional information    Patient is accompanied by self I have received verbal consent from Joanna Duran to discuss any/all medical information while they are present in the room.

## 2022-10-17 DIAGNOSIS — F32.A ANXIETY AND DEPRESSION: ICD-10-CM

## 2022-10-17 DIAGNOSIS — F41.9 ANXIETY AND DEPRESSION: ICD-10-CM

## 2022-10-17 RX ORDER — FLUOXETINE 10 MG/1
CAPSULE ORAL
Qty: 90 CAPSULE | Refills: 1 | Status: SHIPPED | OUTPATIENT
Start: 2022-10-17

## 2022-12-15 ENCOUNTER — PATIENT MESSAGE (OUTPATIENT)
Dept: PRIMARY CARE CLINIC | Age: 37
End: 2022-12-15

## 2022-12-15 DIAGNOSIS — F41.9 ANXIETY AND DEPRESSION: ICD-10-CM

## 2022-12-15 DIAGNOSIS — F32.A ANXIETY AND DEPRESSION: ICD-10-CM

## 2022-12-15 RX ORDER — FLUOXETINE 10 MG/1
10 CAPSULE ORAL DAILY
Qty: 90 CAPSULE | Refills: 0 | Status: SHIPPED | OUTPATIENT
Start: 2022-12-15

## 2022-12-20 DIAGNOSIS — F32.A ANXIETY AND DEPRESSION: ICD-10-CM

## 2022-12-20 DIAGNOSIS — F41.9 ANXIETY AND DEPRESSION: ICD-10-CM

## 2022-12-20 RX ORDER — FLUOXETINE 10 MG/1
20 CAPSULE ORAL DAILY
Qty: 90 CAPSULE | Refills: 0 | Status: SHIPPED | OUTPATIENT
Start: 2022-12-20

## 2022-12-20 NOTE — TELEPHONE ENCOUNTER
Patient requested refill. Needs to be 20 mg, not 10 mg. PCP: Luna Minor MD    Last appt: 5/4/2022  No future appointments. Requested Prescriptions     Pending Prescriptions Disp Refills    FLUoxetine (PROzac) 10 mg capsule 90 Capsule 0     Sig: Take 1 Capsule by mouth daily.        Prior labs and Blood pressures:  BP Readings from Last 3 Encounters:   05/04/22 128/89   12/01/20 (!) 140/88     Lab Results   Component Value Date/Time    Sodium 138 05/27/2022 08:41 AM    Potassium 4.2 05/27/2022 08:41 AM    Chloride 107 05/27/2022 08:41 AM    CO2 26 05/27/2022 08:41 AM    Anion gap 5 05/27/2022 08:41 AM    Glucose 89 05/27/2022 08:41 AM    BUN 11 05/27/2022 08:41 AM    Creatinine 0.77 05/27/2022 08:41 AM    BUN/Creatinine ratio 14 05/27/2022 08:41 AM    GFR est AA >60 05/27/2022 08:41 AM    GFR est non-AA >60 05/27/2022 08:41 AM    Calcium 8.9 05/27/2022 08:41 AM

## 2023-01-30 DIAGNOSIS — F32.A ANXIETY AND DEPRESSION: ICD-10-CM

## 2023-01-30 DIAGNOSIS — F41.9 ANXIETY AND DEPRESSION: ICD-10-CM

## 2023-01-30 RX ORDER — FLUOXETINE 10 MG/1
CAPSULE ORAL
Qty: 90 CAPSULE | Refills: 0 | Status: SHIPPED | OUTPATIENT
Start: 2023-01-30

## 2023-05-28 DIAGNOSIS — F41.9 ANXIETY DISORDER, UNSPECIFIED: ICD-10-CM

## 2023-05-28 RX ORDER — FLUOXETINE 10 MG/1
CAPSULE ORAL
Qty: 30 CAPSULE | Refills: 0 | Status: SHIPPED | OUTPATIENT
Start: 2023-05-28 | End: 2023-06-16 | Stop reason: ALTCHOICE

## 2023-06-16 PROBLEM — G93.2 PSEUDOTUMOR CEREBRI SYNDROME: Status: ACTIVE | Noted: 2023-06-16

## 2023-06-16 PROBLEM — F41.9 ANXIETY: Status: ACTIVE | Noted: 2023-06-16

## 2023-06-20 NOTE — TELEPHONE ENCOUNTER
PCP: Davis Srivastava MD    Last Visit 6/16/2023   No future appointments.     Requested Prescriptions     Pending Prescriptions Disp Refills    Tetanus-Diphth-Acell Pertussis (BOOSTRIX) 5-2.5-18.5 LF-MCG/0.5 injection 0.5 mL 0     Sig: Inject 0.5 mLs into the muscle once for 1 dose         Other Comments:

## 2023-06-28 DIAGNOSIS — Z00.00 PHYSICAL EXAM: ICD-10-CM

## 2023-06-28 DIAGNOSIS — Z11.4 ENCOUNTER FOR SCREENING FOR HIV: ICD-10-CM

## 2023-06-28 LAB
ALBUMIN SERPL-MCNC: 3.8 G/DL (ref 3.5–5)
ALBUMIN/GLOB SERPL: 1 (ref 1.1–2.2)
ALP SERPL-CCNC: 104 U/L (ref 45–117)
ALT SERPL-CCNC: 33 U/L (ref 12–78)
ANION GAP SERPL CALC-SCNC: 5 MMOL/L (ref 5–15)
AST SERPL-CCNC: 16 U/L (ref 15–37)
BILIRUB SERPL-MCNC: 0.4 MG/DL (ref 0.2–1)
BUN SERPL-MCNC: 11 MG/DL (ref 6–20)
BUN/CREAT SERPL: 15 (ref 12–20)
CALCIUM SERPL-MCNC: 9.1 MG/DL (ref 8.5–10.1)
CHLORIDE SERPL-SCNC: 107 MMOL/L (ref 97–108)
CHOLEST SERPL-MCNC: 178 MG/DL
CO2 SERPL-SCNC: 25 MMOL/L (ref 21–32)
CREAT SERPL-MCNC: 0.73 MG/DL (ref 0.55–1.02)
ERYTHROCYTE [DISTWIDTH] IN BLOOD BY AUTOMATED COUNT: 12.3 % (ref 11.5–14.5)
GLOBULIN SER CALC-MCNC: 3.8 G/DL (ref 2–4)
GLUCOSE SERPL-MCNC: 99 MG/DL (ref 65–100)
HCT VFR BLD AUTO: 47.1 % (ref 35–47)
HDLC SERPL-MCNC: 45 MG/DL
HDLC SERPL: 4 (ref 0–5)
HGB BLD-MCNC: 15.2 G/DL (ref 11.5–16)
LDLC SERPL CALC-MCNC: 109 MG/DL (ref 0–100)
MCH RBC QN AUTO: 30.4 PG (ref 26–34)
MCHC RBC AUTO-ENTMCNC: 32.3 G/DL (ref 30–36.5)
MCV RBC AUTO: 94.2 FL (ref 80–99)
NRBC # BLD: 0 K/UL (ref 0–0.01)
NRBC BLD-RTO: 0 PER 100 WBC
PLATELET # BLD AUTO: 390 K/UL (ref 150–400)
PMV BLD AUTO: 9.6 FL (ref 8.9–12.9)
POTASSIUM SERPL-SCNC: 4.1 MMOL/L (ref 3.5–5.1)
PROT SERPL-MCNC: 7.6 G/DL (ref 6.4–8.2)
RBC # BLD AUTO: 5 M/UL (ref 3.8–5.2)
SODIUM SERPL-SCNC: 137 MMOL/L (ref 136–145)
TRIGL SERPL-MCNC: 120 MG/DL
TSH SERPL DL<=0.05 MIU/L-ACNC: 2.05 UIU/ML (ref 0.36–3.74)
VLDLC SERPL CALC-MCNC: 24 MG/DL
WBC # BLD AUTO: 8.2 K/UL (ref 3.6–11)

## 2023-06-29 LAB
HIV 1+2 AB+HIV1 P24 AG SERPL QL IA: NONREACTIVE
HIV 1/2 RESULT COMMENT: NORMAL

## 2024-03-13 DIAGNOSIS — F41.9 ANXIETY: ICD-10-CM

## 2024-03-13 RX ORDER — FLUOXETINE HYDROCHLORIDE 20 MG/1
CAPSULE ORAL DAILY
Qty: 90 CAPSULE | Refills: 1 | Status: SHIPPED | OUTPATIENT
Start: 2024-03-13

## 2024-09-28 DIAGNOSIS — F41.9 ANXIETY: ICD-10-CM

## 2024-12-23 DIAGNOSIS — F41.9 ANXIETY: ICD-10-CM

## 2025-03-21 DIAGNOSIS — F41.9 ANXIETY: ICD-10-CM
